# Patient Record
Sex: MALE | Race: WHITE | Employment: FULL TIME | URBAN - METROPOLITAN AREA
[De-identification: names, ages, dates, MRNs, and addresses within clinical notes are randomized per-mention and may not be internally consistent; named-entity substitution may affect disease eponyms.]

---

## 2021-01-03 ENCOUNTER — OFFICE VISIT (OUTPATIENT)
Dept: URGENT CARE | Facility: URGENT CARE | Age: 41
End: 2021-01-03
Payer: COMMERCIAL

## 2021-01-03 VITALS
SYSTOLIC BLOOD PRESSURE: 147 MMHG | TEMPERATURE: 98.1 F | OXYGEN SATURATION: 99 % | HEART RATE: 95 BPM | WEIGHT: 265 LBS | HEIGHT: 76 IN | BODY MASS INDEX: 32.27 KG/M2 | RESPIRATION RATE: 18 BRPM | DIASTOLIC BLOOD PRESSURE: 93 MMHG

## 2021-01-03 DIAGNOSIS — R07.89 CHEST WALL PAIN: Primary | ICD-10-CM

## 2021-01-03 DIAGNOSIS — R03.0 ELEVATED BLOOD PRESSURE READING WITHOUT DIAGNOSIS OF HYPERTENSION: ICD-10-CM

## 2021-01-03 DIAGNOSIS — Z82.49 FAMILY HISTORY OF ISCHEMIC HEART DISEASE: ICD-10-CM

## 2021-01-03 LAB
ANION GAP SERPL CALCULATED.3IONS-SCNC: 4 MMOL/L (ref 3–14)
BUN SERPL-MCNC: 12 MG/DL (ref 7–30)
CALCIUM SERPL-MCNC: 9.1 MG/DL (ref 8.5–10.1)
CHLORIDE SERPL-SCNC: 103 MMOL/L (ref 94–109)
CO2 SERPL-SCNC: 28 MMOL/L (ref 20–32)
CREAT SERPL-MCNC: 0.88 MG/DL (ref 0.66–1.25)
GFR SERPL CREATININE-BSD FRML MDRD: >90 ML/MIN/{1.73_M2}
GLUCOSE SERPL-MCNC: 116 MG/DL (ref 70–99)
POTASSIUM SERPL-SCNC: 4.5 MMOL/L (ref 3.4–5.3)
SODIUM SERPL-SCNC: 135 MMOL/L (ref 133–144)
TROPONIN I SERPL-MCNC: <0.015 UG/L (ref 0–0.04)

## 2021-01-03 PROCEDURE — 93000 ELECTROCARDIOGRAM COMPLETE: CPT | Performed by: INTERNAL MEDICINE

## 2021-01-03 PROCEDURE — 84484 ASSAY OF TROPONIN QUANT: CPT | Performed by: INTERNAL MEDICINE

## 2021-01-03 PROCEDURE — 80048 BASIC METABOLIC PNL TOTAL CA: CPT | Performed by: INTERNAL MEDICINE

## 2021-01-03 PROCEDURE — 99203 OFFICE O/P NEW LOW 30 MIN: CPT | Performed by: INTERNAL MEDICINE

## 2021-01-03 PROCEDURE — 36415 COLL VENOUS BLD VENIPUNCTURE: CPT | Performed by: INTERNAL MEDICINE

## 2021-01-03 ASSESSMENT — MIFFLIN-ST. JEOR: SCORE: 2208.53

## 2021-01-03 NOTE — PROGRESS NOTES
"SUBJECTIVE:  Danie Amador, a 40 year old male, presents for evaluation of chest tightness.  Duration of symptoms: 2 day(s). He is noting a substernal chest pressure and a \"stretching sensation\" in the left pectoral region. Denies radiation of pain.  This sensation can last for up to an hour.  He denies an association with exertion.  Some decrease in pain when supine. No change with eating. No change with deep breath.  Some back pain with left shoulder movement.  Denies associated nausea or vomiting.  No diaphoresis, palpitations, lightheadedness.  He may experience some postural intolerance but not recently.      PMH: asthma (childhood); no HTN or hyperlipiidemia    SOCH: former smoker (quit six years ago); 1/5 ppd x 15 years    FMH: father has had MI    OBJECTIVE:  BP (!) 147/93 (BP Location: Right arm, Patient Position: Sitting, Cuff Size: Adult Regular)   Pulse 95   Temp 98.1  F (36.7  C) (Tympanic)   Resp 18   Ht 1.93 m (6' 4\")   Wt 120.2 kg (265 lb)   SpO2 99%   BMI 32.26 kg/m    GENERAL: healthy, alert and no distress  HENT: ear canals and TM's normal and nose and mouth without ulcers or lesions  NECK: no adenopathy, no asymmetry, masses, or scars and thyroid normal to palpation  RESP: clear to auscultation and percussion bilaterally; normal I:E ratio  CV: regular rates and rhythm, normal S1 S2, no S3 or S4 and no murmur, click or rub -  ABDOMEN: soft, nontender, without hepatosplenomegaly or masses and bowel sounds normal  EXT: no cyanosis, clubbing or edema; peripheral pulses are brisk and symmetric in the radials, dorsalis pedis and posterior tibials bilaterally  M/SKEL: there is tenderness to palpation in the left pectoral muscle and along the costosternal junction; pain is increased with resisted external rotation of the shoulder    EKG, which I have personally reviewed and interpreted, shows NSR without ischemic ST-T changes.  There is some evidence of nonspecific intraventricular conduction " delay (Rsr') in lead II.    LAB:  Recent Labs   Lab Test 01/03/21  1156 07/07/15  0915    136   POTASSIUM 4.5 4.1   CHLORIDE 103 104   CO2 28 25   BUN 12 17   CR 0.88 0.84   * 119*     Troponin I ES   Date Value Ref Range Status   01/03/2021 <0.015 0.000 - 0.045 ug/L Final     Comment:     The 99th percentile for upper reference range is 0.045 ug/L.  Troponin values   in the range of 0.045 - 0.120 ug/L may be associated with risks of adverse   clinical events.       ASSESSMENT/PLAN:    ICD-10-CM    1. Chest wall pain  R07.89 EKG 12-lead complete w/read - Clinics     Basic metabolic panel  (Ca, Cl, CO2, Creat, Gluc, K, Na, BUN)     Troponin I   2. Family history of ischemic heart disease  Z82.49    3. Elevated blood pressure reading without diagnosis of hypertension  R03.0      Considered differential diagnosis of chest pain in a 40 year old male with a family history of ischemic heart disease.  He is a former smoker but not a significant smoking history.  No other risk factors.  Possible causes include myocardial ischemia, pulmonary embolus, pneumothorax, pneumonia, pleuritis, GERD, esophageal spasm, chest wall myofascial pain, herpes zoster, other radicular pain or neuropathic pain, somatic manifestation of anxiety/panic.  The clinical features (primarily reproducible pain with palpation and resisted ROM in the left shoulder) all point to a myofascial source.  EKG and troponin are very reassuring.  Probability of undiagnosed cardiac ischemia is less than 1%.      The elevated BP is noted.  He should see his PCP and have a repeat BP measurement.    See patient instructions.    Krishna Krishnamurthy MD

## 2021-01-03 NOTE — PATIENT INSTRUCTIONS
The heart tracing and blood work are looking very good.  This pain is almost certainly muscular in origin related to pectoral muscle and/or chest wall strain.  No worries about a dangerous source of chest discomfort.  Use ibuprofen 600-800 mg three times daily with food.